# Patient Record
Sex: MALE | Race: BLACK OR AFRICAN AMERICAN | URBAN - METROPOLITAN AREA
[De-identification: names, ages, dates, MRNs, and addresses within clinical notes are randomized per-mention and may not be internally consistent; named-entity substitution may affect disease eponyms.]

---
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
## 2024-02-24 NOTE — PROGRESS NOTES
Subjective:     History of Present Illness  Juliana Linder is a 15 y.o. male who presents with  Mother for routine physical.  Mother reports patient does have hx of asthma and reports has excarbations with the weather changes. Comments comments flares occur when it's really cold or hot outside. States he was previously taking advair 250/50 and albuterol as needed. Has not had any medications in about 1 year. Patient does report having a cough    Review of Systems  Constitutional: negative for fevers and chills  Ears, nose, mouth, throat, and face: positive for nasal congestion, negative for earaches and sore throat  Respiratory: positive for cough, negative for wheezing or dyspnea on exertion  Cardiovascular: negative for chest pain, chest pressure/discomfort, dyspnea, palpitations, irregular heart beats  Gastrointestinal: negative for change in bowel habits and abdominal pain  Genitourinary:negative for frequency, dysuria and hesitancy  Musculoskeletal:negative for myalgias and arthralgias    Patient Active Problem List   Diagnosis Code    Mild intermittent asthma without complication H65.49     Patient Active Problem List    Diagnosis Date Noted    Mild intermittent asthma without complication 56/19/2601       No Known Allergies  Past Medical History:   Diagnosis Date    Asthma      History reviewed. No pertinent surgical history.   Family History   Problem Relation Age of Onset    Asthma Mother     Asthma Father     Asthma Brother     Diabetes Paternal Uncle      Social History   Substance Use Topics    Smoking status: Never Smoker    Smokeless tobacco: Never Used    Alcohol use No       Objective:     Visit Vitals    /81 (BP 1 Location: Left arm, BP Patient Position: Sitting)    Pulse 74    Temp 98.1 °F (36.7 °C) (Oral)    Resp 18    Ht 5' 7\" (1.702 m)    Wt 209 lb (94.8 kg)    SpO2 98%    BMI 32.73 kg/m2     General appearance  alert, cooperative, no distress, appears stated age Head  Normocephalic, without obvious abnormality, atraumatic   Eyes  conjunctivae/corneas clear. PERRL, EOM's intact. Fundi benign   Ears  normal TM's and external ear canals AU   Nose Nares normal. Septum midline. Mucosa pale, boggy with clear secretions. No drainage or sinus tenderness   Throat Lips, mucosa, and tongue normal. Teeth and gums normal   Neck supple, symmetrical, trachea midline, no adenopathy, thyroid: not enlarged, symmetric, no tenderness/mass/nodules, no carotid brui   Back   symmetric, no curvature. ROM normal. No CVA tenderness   Lungs   clear to auscultation bilaterally   Chest wall  no tenderness   Heart  regular rate and rhythm, S1, S2 normal, no murmur, click, rub or gallop   Abdomen   soft, non-tender. Bowel sounds normal. No masses,  No organomegaly   Extremities extremities normal, atraumatic, no cyanosis or edema   Pulses 2+ and symmetric   Skin Skin color, texture, turgor normal. No rashes or lesions   Lymph nodes Cervical, supraclavicular, and axillary nodes normal.   Neurologic Normal     Peak flow reading is 265, about 57 % of predicted. Normal Predicted Average Peak Flow: 467  Actual Average Peak Flow: 265  BEST 300    1st attempt: 245  2nd attempt: 250  3rd attempt: 300    Assessment:     Healthy 15 y.o. old male with no physical activity limitations. Encounter Diagnoses   Name Primary?     Mild intermittent asthma without complication Yes    Encounter for routine child health examination without abnormal findings     Allergic rhinitis, unspecified seasonality, unspecified trigger          Plan:   1)Anticipatory Guidance: Gave a handout on well teen issues at this age , importance of varied diet, minimize junk food, importance of regular dental care, seat belts/ sports protective gear/ helmet safety/ swimming safety  2)   Orders Placed This Encounter    fluticasone-salmeterol (ADVAIR) 250-50 mcg/dose diskus inhaler    albuterol (PROVENTIL HFA, VENTOLIN HFA, PROAIR HFA) 90 mcg/actuation inhaler    fluticasone (FLONASE) 50 mcg/actuation nasal spray    zafirlukast (ACCOLATE) 20 mg tablet   I have discussed the diagnosis with the patient and mother and the intended plan as seen in the above orders. The patient and mother has received an after-visit summary and questions were answered concerning future plans. I have discussed medication side effects and warnings with the patient and mother as well. patient and mother verbalizes understanding  patient and mother agreeable with above plan and verbalizes understanding. Follow-up Disposition:  Return in about 6 weeks (around 6/26/2018) for asthma since resuming medication. 270